# Patient Record
Sex: FEMALE | Race: WHITE
[De-identification: names, ages, dates, MRNs, and addresses within clinical notes are randomized per-mention and may not be internally consistent; named-entity substitution may affect disease eponyms.]

---

## 2017-11-17 ENCOUNTER — HOSPITAL ENCOUNTER (OUTPATIENT)
Dept: HOSPITAL 45 - C.LABSPEC | Age: 62
Discharge: HOME | End: 2017-11-17
Attending: INTERNAL MEDICINE
Payer: COMMERCIAL

## 2017-11-17 DIAGNOSIS — Z00.01: Primary | ICD-10-CM

## 2017-11-17 DIAGNOSIS — E66.09: ICD-10-CM

## 2017-11-17 DIAGNOSIS — I10: ICD-10-CM

## 2017-11-17 LAB
ALBUMIN/GLOB SERPL: 1.2 {RATIO} (ref 0.9–2)
ALP SERPL-CCNC: 81 U/L (ref 45–117)
ALT SERPL-CCNC: 45 U/L (ref 12–78)
ANION GAP SERPL CALC-SCNC: 6 MMOL/L (ref 3–11)
AST SERPL-CCNC: 20 U/L (ref 15–37)
BASOPHILS # BLD: 0.03 K/UL (ref 0–0.2)
BASOPHILS NFR BLD: 0.5 %
BUN SERPL-MCNC: 25 MG/DL (ref 7–18)
BUN/CREAT SERPL: 23.9 (ref 10–20)
CALCIUM SERPL-MCNC: 10.2 MG/DL (ref 8.5–10.1)
CHLORIDE SERPL-SCNC: 104 MMOL/L (ref 98–107)
CHOLEST/HDLC SERPL: 3.4 {RATIO}
CO2 SERPL-SCNC: 33 MMOL/L (ref 21–32)
COMPLETE: YES
CREAT SERPL-MCNC: 1.03 MG/DL (ref 0.6–1.2)
EOSINOPHIL NFR BLD AUTO: 305 K/UL (ref 130–400)
GLOBULIN SER-MCNC: 3.5 GM/DL (ref 2.5–4)
GLUCOSE SERPL-MCNC: 99 MG/DL (ref 70–99)
GLUCOSE UR QL: 64 MG/DL
HCT VFR BLD CALC: 48.1 % (ref 37–47)
IG%: 0.2 %
IMM GRANULOCYTES NFR BLD AUTO: 37.9 %
LYMPHOCYTES # BLD: 2.34 K/UL (ref 1.2–3.4)
MCH RBC QN AUTO: 31.6 PG (ref 25–34)
MCHC RBC AUTO-ENTMCNC: 32.2 G/DL (ref 32–36)
MCV RBC AUTO: 98.2 FL (ref 80–100)
MONOCYTES NFR BLD: 7.6 %
NEUTROPHILS # BLD AUTO: 2.1 %
NEUTROPHILS NFR BLD AUTO: 51.7 %
NITRITE UR QL STRIP: 114 MG/DL (ref 0–150)
PH UR: 220 MG/DL (ref 0–200)
PMV BLD AUTO: 10.6 FL (ref 7.4–10.4)
POTASSIUM SERPL-SCNC: 3.3 MMOL/L (ref 3.5–5.1)
RBC # BLD AUTO: 4.9 M/UL (ref 4.2–5.4)
SODIUM SERPL-SCNC: 143 MMOL/L (ref 136–145)
TSH SERPL-ACNC: 1.87 UIU/ML (ref 0.3–4.5)
VERY LOW DENSITY LIPOPROT CALC: 23 MG/DL
WBC # BLD AUTO: 6.17 K/UL (ref 4.8–10.8)

## 2017-11-20 ENCOUNTER — HOSPITAL ENCOUNTER (OUTPATIENT)
Dept: HOSPITAL 45 - C.MAMM | Age: 62
Discharge: HOME | End: 2017-11-20
Attending: INTERNAL MEDICINE
Payer: COMMERCIAL

## 2017-11-20 DIAGNOSIS — N64.89: ICD-10-CM

## 2017-11-20 DIAGNOSIS — Z12.31: Primary | ICD-10-CM

## 2017-11-21 NOTE — MAMMOGRAPHY REPORT
BILATERAL DIGITAL SCREENING MAMMOGRAM TOMOSYNTHESIS WITH CAD: 11/20/2017

CLINICAL HISTORY: Routine screening.  Patient has no complaints.  





TECHNIQUE:  Breast tomosynthesis in addition to standard 2D mammography was performed. Current study 
was also evaluated with a Computer Aided Detection (CAD) system.  



COMPARISON: Comparison is made to exams dated:  5/16/2013 mammogram, 1/30/2012 ultrasound - [facility], 5/12/2009, 11/24/2009 mammogram, 11/24/2009 ultrasound, and 11/3/2010 mammogram 
- [facility].   



BREAST COMPOSITION:  The tissue of both breasts is heterogeneously dense, which may obscure small mas
ses.  



FINDINGS:  There is a 6 mm focal asymmetry in the 12:00 posterior right breast, for which additional 
spot compression views and possible ultrasound are recommended.



No other new suspicious mass, asymmetry, area of distortion or suspicious microcalcifications is seen
 bilaterally.  There are mild vascular calcifications in the breasts.  



IMPRESSION:  ACR BI-RADS CATEGORY 0: INCOMPLETE EVALUATION:  NEED ADDITIONAL IMAGING EVALUATION

The 6 mm focal asymmetry in the 12:00 right breast needs additional evaluation.  

The patient will be called to schedule an appointment.  





Approximately 10% of breast cancers are not detected with mammography. A negative mammographic report
 should not delay biopsy if a clinically suggestive mass is present.



Wendy Mir M.D.          

ay/:11/20/2017 16:07:49  



Imaging Technologist: Ashley TORRES)(FABIANO), [facility]

letter sent: Addl Imaging 0  

BI-RADS Code: ACR BI-RADS Category 0: Incomplete Evaluation:  Need Additional Imaging Evaluation

## 2017-12-12 ENCOUNTER — HOSPITAL ENCOUNTER (OUTPATIENT)
Dept: HOSPITAL 45 - C.MAMM | Age: 62
Discharge: HOME | End: 2017-12-12
Attending: INTERNAL MEDICINE
Payer: COMMERCIAL

## 2017-12-12 DIAGNOSIS — N63.0: Primary | ICD-10-CM

## 2017-12-12 NOTE — MAMMOGRAPHY REPORT
UNILATERAL RIGHT DIGITAL DIAGNOSTIC MAMMOGRAM TOMOSYNTHESIS WITH CAD AND TARGETED RIGHT ULTRASOUND: 1
2/12/2017

CLINICAL HISTORY: Callback from screening mammography for a 6 mm focal asymmetry versus mass in the 1
2:00 posterior right breast.  





TECHNIQUE:  Spot compression right CC and MLO 2-D and tomosynthesis images were obtained.



COMPARISON: Comparison is made to exams dated:  11/20/2017 mammogram, 5/16/2013 mammogram, 1/30/2012 
ultrasound, 7/27/2011 ultrasound, and 7/27/2011 mammogram - [facility].   



BREAST COMPOSITION:  The tissue of the right breast is heterogeneously dense, which may obscure small
 masses.  



FINDINGS: The additional spot compression tomosynthesis images of the right breast demonstrate a pers
istent oval mass in the 12:00 posterior right breast, located 6.6 cm distal to the nipple in the CC p
rojection, measuring 5.9 x 4.5 x 4.6 mm.  No associated architectural distortion or microcalcificatio
n.  No other obvious mass, focal area of distortion or asymmetry is identified.  Further evaluation w
ith ultrasound was performed.



Targeted ultrasound was performed in the superior right breast 11:00, 12:00 and 1:00 axes.  In the 12
:00 breast, 2 cm from the nipple, there is an oval parallel circumscribed hypoechoic solid versus cys
tic mass with mild posterior acoustic enhancement appreciated suggesting this may represent a cyst.  
It measures 5.0 x 3.0 x 5.4 mm and this may correspond to the circumscribed oval mammographic mass se
en on screening.  Incidentally identified in the 11:00 right breast, 2 cm from the nipple, is an isoe
choic solid appearing mass measuring 11.0 x 3.2 mm.  This is indeterminate given the solid nature, an
d definitive characterization with an ultrasound guided core biopsy is recommended.  At the same time
 as core biopsy, the suspected cystic mass in the 12:00 right breast, 2 cm from the nipple should als
o be aspirated to ensure collapse/resolution after aspiration.





IMPRESSION:  ACR BI-RADS CATEGORY 4: SUSPICIOUS, TARGETED ULTRASOUND ACR BI-RADS CATEGORY 4: SUSPICIO
US 

1.  Ultrasound-guided core biopsy is recommended for an incidentally identified 11 mm solid appearing
 mass in the 11:00 right breast, 2 cm from the nipple.

2.  There is a hypoechoic possibly cystic mass in the 12:00 right breast, 2 cm from the nipple on ult
rasound measuring 5.4 mm, thought to correlate with a persistent oval circumscribed mammographic mass
 in the 12:00 axis identified on the screening mammogram and additional spot compression views.  At t
he time of core biopsy in the 11:00 right breast, ultrasound guided cyst aspiration should be perform
ed to ensure cystic nature and resolution of this mass otherwise core needle biopsy will be needed.  
Also, post procedure tomosynthesis mammograms should be obtained to ensure mammographicsonographic c
orrelation.



These results and recommendations were discussed with the patient at the time of the exam.  She tenta
tively scheduled the right breast ultrasound guided core biopsy and cyst aspiration prior to leaving 
our department.



Approximately 10% of breast cancers are not detected with mammography. A negative mammographic report
 should not delay biopsy if a clinically suggestive mass is present.



Wendy Mir M.D.          

ay/:12/12/2017 12:30:23  



Imaging Technologist: Ashley SORIA(R)(FABIANO), [facility]

letter sent: Abnormal 4/5  

BI-RADS Code: ACR BI-RADS Category 4: Suspicious  Ultrasound BI-RADS: ACR BI-RADS Category 4: Suspici
ous

## 2017-12-27 ENCOUNTER — HOSPITAL ENCOUNTER (OUTPATIENT)
Dept: HOSPITAL 45 - C.MAMM | Age: 62
Discharge: HOME | End: 2017-12-27
Attending: INTERNAL MEDICINE
Payer: COMMERCIAL

## 2017-12-27 DIAGNOSIS — N63.0: Primary | ICD-10-CM

## 2017-12-27 DIAGNOSIS — N60.01: ICD-10-CM

## 2017-12-27 NOTE — MAMMOGRAPHY REPORT
ASPIRATION: 12/27/2017

CLINICAL HISTORY: Cystic appearing mass in the 12:00 right breast, 2.5 centimeters deep to the dermis
 abutting the pectoralis muscle, and solid appearing mass in the 11:00 to 12:00 right breast, 2 cm fr
om the nipple.  Patient presents for ultrasound-guided cyst aspiration of the cystic appearing mass a
nd core biopsy of the solid appearing mass.  



Please refer to report from right breast ultrasound guided core biopsy performed at the same time for
 full detail.   







IMPRESSION: ASPIRATION

Please refer to report from right breast ultrasound guided core biopsy performed at the same time for
 full detail.   



Wendy Mir M.D.  

ay/:12/27/2017 12:37:28  



Imaging Technologist: Sonja SORIA(ABEL)(M), [facility]

## 2017-12-27 NOTE — DISCHARGE INSTRUCTIONS
Discharge Instructions


Procedure


Procedure Date:


Dec 27, 2017.


Reason for visit:


Right Masses/Cyst Aspiration.





Discharge


Discharge Date:


Dec 27, 2017.


Discharge Diagnosis:


post right breast ultrasound guided cyst aspiration and core biopsy





Instructions


Activity Recommendations:  Additional Limitations (see below)


Return to School/Work:  no limitations


Recommended Home Diet:  No Limitations


Provider Instructions:





ACTIVITY RECOMMENDATIONS:





*  No lifting, pushing, pulling or exercising the affected side for three days.








RETURN TO SCHOOL/WORK:





*  You may return to work/school after the procedure, but do not perform any 

strenuous


   activities for 24 to 48 hours.








MEDICATIONS:





*  Tylenol (two 325 mg) every four to six hours if needed for mild pain (if not 

allergic to Tylenol).








DIET:





*  Resume previous diet.








SPECIAL CARE INSTRUCTIONS:





*  Keep biopsy site dry for 24 hours.  May shower after 24 hours, but do not 

soak (bathe)


   incision.





*  May remove Tegaderm (plastic patch) tomorrow AFTER showering.





*  Leave the steri-strips on for one week.  Allow the steri-strips to fall off 

by themselves.  


   If not off after one week, you may remove them.  You may place a Bandaid


   crosswise over the strips, if desired.





*  Apply ice 10 minutes on and 10 minutes off as needed.





*  Wear a bra at bedtime to sleep more comfortably for 2-3 days.





*  Your referring physician should have the results after approximately 5 to 7 

business days.





*  Call for unusual bleeding, fever, drainage, etc or if you have any questions 

call


    [phone] during normal business hours or after hours call Dr Mir, 

[phone].








FOLLOW UP VISIT:





Follow-up with Referring Physician as scheduled.





Dominguez Fraser Recommendations:


 


Call your doctor if:





*  Temperature above 101 degrees


*  Pain not relieved by pain medicine ordered


*  There is increased drainage or redness from any incision


*  You have any unanswered questions or concerns.





Your Doctors Instructions noted above were prepared by provider Wendy Mir.


Patient Signature Section:





 Patient Instructions Signature Page














Lida Radha 











Patient (or Guardian) Signature/Date:____________________________________ I 

have read and understand the instructions given to me by my caregivers.








Caregiver/RN/Doctor Signature/Date:____________________________________











The above-named patient and/or guardian has received patient instructions on 

this date.





























+  Original Patient Signature Page (only) stays with chart.  Please make copy 

for patient.

## 2017-12-27 NOTE — MAMMOGRAPHY REPORT
ULTRASOUND GUIDED BIOPSY RIGHT BREAST: 12/27/2017

CLINICAL HISTORY: 62-year-old woman recently called back from screening mammography for a 6 mm possib
le mass in the 12:00 right breast posteriorly.  Diagnostic mammograms and ultrasound demonstrated a p
ersistent mammographic mass thought to correspond to a cystic appearing mass in the 12:00 right breas
t on ultrasound.  Incidentally seen while scanning for the mammographic mass is a solid appearing mas
s in the 11:00 to 12:00 right breast, 2 cm from the nipple, for which ultrasound guided core biopsy w
as recommended.  



COMPARISON: Comparison is made to exams dated:  12/27/2017 aspiration, 12/12/2017 ultrasound, 12/12/2
017 mammogram, 11/20/2017 mammogram, 5/16/2013 mammogram, and 1/30/2012 ultrasound - [facility] [address].



PATIENT CONSENT: The procedure of both ultrasound-guided cyst aspiration and ultrasound-guided core b
iopsy were explained to the patient as well as risks, benefits and alternatives of the procedures. In
formed consent was obtained both verbally and in writing.  Specific risks to this procedure include: 
bleeding, infection, puncture of adjacent structure, nontarget biopsy, sampling error, pain, metal al
lergy and medication reaction.  



PROCEDURE DESCRIPTIONS: A time out was performed and the right breast was agreed as the site of both 
facet aspiration and core needle biopsy.



First the cyst aspiration was performed in the 12:00 right breast.  The skin was prepped and draped i
n the usual sterile fashion. The hypoechoic to anechoic cystic appearing mass in the 12:00 right faith
st was identified and targeted for cyst aspiration.  1% buffered lidocaine with and without epinephri
ne was administered as local anesthesia.  A 22-gauge needle was advanced within the suspected cyst an
d is collapsed.  Pre-and post procedure images document complete resolution of the mass after aspirat
ion.  A small amount of clear, straw-colored fluid was obtained and rinsed in CytoLyt then sent to [facility] pathology department for cytologic analysis.



Then the ultrasound-guided core biopsy in the 11 to 12:00 right breast, 2 cm from the nipple was perf
ormed.  The isoechoic to hypoechoic solid appearing lobulated mass was identified and targeted for bi
opsy.  Additional subcutaneous and intraparenchymal 1% buffered lidocaine, with and without epinephri
ne, was administered as local anesthesia. A skin incision was made.  Through the incision, 5 samples 
were taken with a 14 gauge Achieve biopsy device. A ribbon shaped metallic marker was placed at the b
iopsy site. Hemostasis was achieved after manual compression. The patient tolerated the procedure wel
l and there was no immediate complication.  The samples were sent to the pathology department in an a
ppropriately labeled container.  



Postprocedure right CC and ML tomosynthesis images were obtained.  There is resolution of the 6 mm ci
rcumscribed mass in the 12:00 posterior right breast, confirming mammographicsonographic correlation
 of the aspirated cyst.  A biopsy marker clip is seen more anteriorly in the 12:00 right breast at th
e site of the biopsied solid appearing mass identified incidentally on ultrasound.





IMPRESSION: ULTRASOUND GUIDED BIOPSY

1.  Status post ultrasound-guided cyst aspiration to resolution of a cystic masses in the 12:00 deep 
right breast, which correlates with a mammographic mass, initially identified on the 11/20/2017 [facility] mammogram.

2.  Status post ultrasound-guided core biopsy of an incidentally identified solid appearing mass in t
he 11 to 12:00 right breast.

3.  Pending benign pathology and cytology results, can resume routine screening mammography in one ye
ar.



The patient will receive notification of the biopsy results from her referring physician.





Wendy Mir M.D.  

ay/:12/27/2017 12:46:27  



Imaging Technologist: Sonja TORRES)(FABIANO), [facility]

## 2017-12-27 NOTE — MAMMOGRAPHY REPORT
UNILATERAL RIGHT DIGITAL DIAGNOSTIC MAMMOGRAM TOMOSYNTHESIS: 12/27/2017

CLINICAL HISTORY: Status post ultrasound-guided cyst aspiration and ultrasound-guided core biopsy in 
the 11:00 to 12:00 right breast.  



Please refer to the report from right breast ultrasound guided core biopsy performed at the same time
 for full detail.



IMPRESSION:  POST PROCEDURE IMAGING FOR MARKER PLACEMENT

Please refer to the report from right breast ultrasound guided core biopsy performed at the same time
 for full detail.



Approximately 10% of breast cancers are not detected with mammography. A negative mammographic report
 should not delay biopsy if a clinically suggestive mass is present.



Wendy Mir M.D.          

ay/:12/27/2017 12:36:16  



Imaging Technologist: Sonja SORIA(ABEL)(M), [facility]



BI-RADS Code: Post Procedure Imaging For Marker Placement

## 2018-01-19 ENCOUNTER — HOSPITAL ENCOUNTER (OUTPATIENT)
Dept: HOSPITAL 45 - C.PAPS | Age: 63
Discharge: HOME | End: 2018-01-19
Attending: INTERNAL MEDICINE
Payer: COMMERCIAL

## 2018-01-19 DIAGNOSIS — Z12.4: Primary | ICD-10-CM
